# Patient Record
Sex: MALE | URBAN - METROPOLITAN AREA
[De-identification: names, ages, dates, MRNs, and addresses within clinical notes are randomized per-mention and may not be internally consistent; named-entity substitution may affect disease eponyms.]

---

## 2017-04-04 ENCOUNTER — EMPLOYEE WELLNESS (OUTPATIENT)
Dept: OTHER | Age: 37
End: 2017-04-04

## 2017-04-04 LAB
CHOLESTEROL, TOTAL: 217 MG/DL (ref 0–199)
GLUCOSE BLD-MCNC: 92 MG/DL (ref 70–99)
HDLC SERPL-MCNC: 56 MG/DL (ref 40–60)
LDL CHOLESTEROL CALCULATED: 148 MG/DL
TRIGL SERPL-MCNC: 63 MG/DL (ref 0–150)

## 2018-03-20 VITALS — WEIGHT: 175 LBS

## 2023-04-10 PROBLEM — S83.231A COMPLEX TEAR OF MEDIAL MENISCUS OF RIGHT KNEE AS CURRENT INJURY: Status: ACTIVE | Noted: 2023-04-10

## 2023-04-17 ENCOUNTER — HOSPITAL ENCOUNTER (OUTPATIENT)
Dept: PHYSICAL THERAPY | Age: 43
Setting detail: THERAPIES SERIES
Discharge: HOME | End: 2023-04-17
Payer: COMMERCIAL

## 2023-04-17 PROCEDURE — 97016 VASOPNEUMATIC DEVICE THERAPY: CPT

## 2023-04-17 PROCEDURE — 97110 THERAPEUTIC EXERCISES: CPT

## 2023-04-17 PROCEDURE — 97140 MANUAL THERAPY 1/> REGIONS: CPT

## 2023-04-17 NOTE — FLOWSHEET NOTE
[address] [facility], [address] [place] [address], [address] [place] [address] [address]  Phone: [phone]   Fax:     [phone]    Physical Therapy Treatment Note/ Progress Report:     Date:  2023    Patient Name:  Suzette Machado    :  1980  MRN: [id number]  Restrictions/Precautions:    Medical/Treatment Diagnosis Information:  Right knee PMM (Surgery date: 4/10/23)    ICD-10-CM    1. Acute pain of left knee  M25.562         Insurance/Certification information:  PT Insurance Information: [employer]  Physician Information:  Ángel Ellington DO   Has the plan of care been signed (Y/N):        []  Yes  [x]  No     Date of Patient follow up with Physician: 23    Is this a Progress Report:     []  Yes  [x]  No      If Yes:  Date Range for reporting period:  Initial Eval: 2023  Beginnin2023 --- Endin23    Progress report will be due (10 Rx or 30 days whichever is less): 3/28/34     Recertification will be due (POC Duration  / 90 days whichever is less): 23     Visit # Insurance Allowable Auth Required   2 30 []  Yes     [x]  No      LEFS Score: 59% 33     Date assessed: 2023      Latex Allergy:  [x]NO      []YES  Preferred Language for Healthcare:   [x]English       []other:    Pain level:  3/10     SUBJECTIVE:  Patient reports active weekend, cut grass and did feel it swelled up on him.      OBJECTIVE:   Observation:   Test measurements:     RESTRICTIONS/PRECAUTIONS:     Exercises/Interventions:   Therapeutic Ex (47008)  Therapeutic Activity (08034)  NMR re-education (56612) Sets/Reps Notes/CUES   Bike          QS 10 x 10\"    Straight leg raise 3 x 10    Side lying abduction 3 x 10    Bridge with band 2 x 10 Blue   Clamshell 2 x 10 Blue   [place] 15 x Blue   Heel raises 20 x    Squat  2 x 10    FSU/LSU 20 x  6\"   Wall sit 10 x 10\" Selected depth                                                          Manual Intervention

## 2023-04-24 ENCOUNTER — HOSPITAL ENCOUNTER (OUTPATIENT)
Dept: PHYSICAL THERAPY | Age: 43
Setting detail: THERAPIES SERIES
Discharge: HOME | End: 2023-04-24
Payer: COMMERCIAL

## 2023-04-24 PROCEDURE — 97110 THERAPEUTIC EXERCISES: CPT

## 2023-04-24 PROCEDURE — 97112 NEUROMUSCULAR REEDUCATION: CPT

## 2023-04-24 NOTE — FLOWSHEET NOTE
[address]23 [facility], [facility] [address], [address] [place] [address] [address]  Phone: [phone]   Fax:     [phone]    Physical Therapy Treatment Note/ Progress Report:     Date:  2023    Patient Name:  Berna Clark    :  1980  MRN: [id number]  Restrictions/Precautions:    Medical/Treatment Diagnosis Information:  Right knee PMM (Surgery date: 4/10/23)    ICD-10-CM    1. Acute pain of left knee  M25.562         Insurance/Certification information:  PT Insurance Information: [employer]  Physician Information:  Cece Blanca DO   Has the plan of care been signed (Y/N):        []  Yes  [x]  No     Date of Patient follow up with Physician: 23    Is this a Progress Report:     []  Yes  [x]  No      If Yes:  Date Range for reporting period:  Initial Eval: 2023  Beginnin2023 --- Endin23    Progress report will be due (10 Rx or 30 days whichever is less):      Recertification will be due (POC Duration  / 90 days whichever is less): 23     Visit # Insurance Allowable Auth Required   3 30 []  Yes     [x]  No      LEFS Score: 59%      Date assessed: 2023      Latex Allergy:  [x]NO      []YES  Preferred Language for Healthcare:   [x]English       []other:    Pain level:  3/10     SUBJECTIVE:  Patient reports he worked a lot in yard and was active over weekend. Sharp pain is gone from before surgery.       OBJECTIVE:   Observation:   Test measurements: Flex  ext ROM 0    RESTRICTIONS/PRECAUTIONS:     Exercises/Interventions:   Therapeutic Ex (87711)  Therapeutic Activity (31111)  NMR re-education (57705) Sets/Reps Notes/CUES   Bike 5 minutes         Straight leg raise 2 x 10 2#        Bridge with band 2 x 10 Blue   Clamshell 2 x 10 Blue   LAQ 30 x 2# some pain, able to get through   [facility] 15 x Blue   Heel raises 20 x    Squat heels elevated  2 x 10    FSU/LSU 20 x  6\"   Wall sit 10 x 10\" Selected